# Patient Record
Sex: MALE
[De-identification: names, ages, dates, MRNs, and addresses within clinical notes are randomized per-mention and may not be internally consistent; named-entity substitution may affect disease eponyms.]

---

## 2023-12-18 ENCOUNTER — APPOINTMENT (OUTPATIENT)
Dept: OTOLARYNGOLOGY | Facility: CLINIC | Age: 23
End: 2023-12-18

## 2023-12-18 PROBLEM — Z00.00 ENCOUNTER FOR PREVENTIVE HEALTH EXAMINATION: Status: ACTIVE | Noted: 2023-12-18

## 2023-12-29 ENCOUNTER — APPOINTMENT (OUTPATIENT)
Dept: OTOLARYNGOLOGY | Facility: CLINIC | Age: 23
End: 2023-12-29
Payer: COMMERCIAL

## 2023-12-29 VITALS
WEIGHT: 315 LBS | OXYGEN SATURATION: 96 % | HEIGHT: 74 IN | TEMPERATURE: 96.6 F | BODY MASS INDEX: 40.43 KG/M2 | SYSTOLIC BLOOD PRESSURE: 138 MMHG | DIASTOLIC BLOOD PRESSURE: 87 MMHG | HEART RATE: 93 BPM

## 2023-12-29 DIAGNOSIS — R40.0 SOMNOLENCE: ICD-10-CM

## 2023-12-29 DIAGNOSIS — R09.81 NASAL CONGESTION: ICD-10-CM

## 2023-12-29 DIAGNOSIS — R06.81 APNEA, NOT ELSEWHERE CLASSIFIED: ICD-10-CM

## 2023-12-29 DIAGNOSIS — H61.23 IMPACTED CERUMEN, BILATERAL: ICD-10-CM

## 2023-12-29 DIAGNOSIS — J34.2 DEVIATED NASAL SEPTUM: ICD-10-CM

## 2023-12-29 DIAGNOSIS — J35.1 HYPERTROPHY OF TONSILS: ICD-10-CM

## 2023-12-29 DIAGNOSIS — R06.83 SNORING: ICD-10-CM

## 2023-12-29 DIAGNOSIS — Z78.9 OTHER SPECIFIED HEALTH STATUS: ICD-10-CM

## 2023-12-29 PROCEDURE — 69210 REMOVE IMPACTED EAR WAX UNI: CPT

## 2023-12-29 PROCEDURE — 99204 OFFICE O/P NEW MOD 45 MIN: CPT | Mod: 25

## 2023-12-29 PROCEDURE — 31525 DX LARYNGOSCOPY EXCL NB: CPT

## 2023-12-29 NOTE — PROCEDURE
[FreeTextEntry3] :  Ear cleaning: Cerumen Removal/Ear Cleaning for Otitis Externa Pre-operative Diagnosis: Bilateral Cerumen Impaction Post-operative Diagnosis: Same Procedure: Binocular microscopy with cerumen removal- [id number] Procedure Details: The patient was placed in the supine position. The operating microscope was positioned. I then placed the ear speculum in the EAC. Cerumen was then removed using a mixture of otologic curettes, and suction. The TM was noted to be intact. I then performed the procedure of the opposite ear in similar fashion. The patient tolerated procedure well. Findings: Bilateral Ear Canal - normal Bilateral Tympanic Membrane - normal Recommendations: Debrox Complications: None [de-identified] : Laryngoscopy: - Pre-operative Diagnosis: head and neck exam Post-operative Diagnosis: left septal deviation, tonsil hypertrophy Anesthesia: Topical - 1 % Lidocaine/Phenylephrine Procedure: Flexible Laryngoscopy  Procedure Details: The patient was placed in the sitting position. After decongestant and anesthesia were applied the laryngoscope was passed. The nasal cavities, nasopharynx, oropharynx, hypopharynx, and larynx were all examined. Vocal folds were examined during respiration and phonation. The following findings were noted:  Findings: Nose: Left septal deviation, turbinates are normal, nasal airways patent, mucosa normal Nasopharynx: Adenoids normal, no masses, eustachian tube normal Oropharynx: Pharyngeal walls symmetric and without lesion. Tonsils/fossae symmetric Hypopharynx: Hypopharynx and pyriform sinuses without lesion. No masses or asymmetry. No pooling of secretions. Larynx: Epiglottis and aryepiglottic folds were sharp and crisp bilaterally. Bilateral false and true vocal folds normal appearance. Bilateral vocal folds fully mobile and symmetric. Airway was widely patent.  Condition: Stable. Patient tolerated procedure well.  Complications: None.

## 2023-12-29 NOTE — HISTORY OF PRESENT ILLNESS
[de-identified] : 12/29/23: 22 y/o M presents with loud snoring for the past 2 years. He has woken up from snoring. His girlfriend has witnessed him gasping for air during sleep approximately two times per night. He feels tired throughout the day. No issues chewing, eating, or swallowing. No voice issues. He denies recurrent tonsillitis. Nonsmoker. He has history of keloids. Of note he also has chronic nasal congestion. For treatment he uses Flonase every other day and Zyrtec.

## 2023-12-29 NOTE — ASSESSMENT
[FreeTextEntry1] : - 22 y/o M presents with loud snoring for the past 2 years. He has woken up from snoring. His girlfriend has witnessed him gasping for air during sleep approximately two times per night. On physical exam/ laryngoscopy he was found to have bilateral enlarged tonsils and left septal deviation. I am recommending polysomnogram to rule out obstructive sleep apnea and consultation with sleep medicine. Based on findings I recommended weight loss, CPAP, possible need for tonsillectomy in the future. Follow up in 3 months.  Of note he also has chronic nasal congestion. For treatment he uses Flonase every other day and Zyrtec.   On exam there is evidence of cerumen impaction. This was cleaned today without issue. Patient noted immediate improvement back to baseline. At this time I am recommending Debrox.   -polysomnogram -consultation with sleep specialist  -follow in 3 months  -Debrox as needed

## 2023-12-29 NOTE — PHYSICAL EXAM
[Normal] : mucosa is normal [Midline] : trachea located in midline position [] : septum deviated to the left [de-identified] : multiple keloid scars  [de-identified] : bilateral cerumen impaction left> right- cleaned away with suction/curette with no issues. [de-identified] : 3+ bilaterally

## 2024-01-04 ENCOUNTER — APPOINTMENT (OUTPATIENT)
Dept: SLEEP CENTER | Facility: HOSPITAL | Age: 24
End: 2024-01-04

## 2024-03-29 ENCOUNTER — APPOINTMENT (OUTPATIENT)
Dept: OTOLARYNGOLOGY | Facility: CLINIC | Age: 24
End: 2024-03-29

## 2024-04-25 ENCOUNTER — NON-APPOINTMENT (OUTPATIENT)
Age: 24
End: 2024-04-25

## 2024-04-26 ENCOUNTER — NON-APPOINTMENT (OUTPATIENT)
Age: 24
End: 2024-04-26

## 2024-07-30 ENCOUNTER — APPOINTMENT (OUTPATIENT)
Dept: OTOLARYNGOLOGY | Facility: CLINIC | Age: 24
End: 2024-07-30
Payer: COMMERCIAL

## 2024-07-30 VITALS
OXYGEN SATURATION: 95 % | TEMPERATURE: 97.7 F | HEIGHT: 74 IN | SYSTOLIC BLOOD PRESSURE: 135 MMHG | DIASTOLIC BLOOD PRESSURE: 91 MMHG | HEART RATE: 96 BPM | BODY MASS INDEX: 40.43 KG/M2 | WEIGHT: 315 LBS

## 2024-07-30 DIAGNOSIS — J35.1 HYPERTROPHY OF TONSILS: ICD-10-CM

## 2024-07-30 DIAGNOSIS — R09.81 NASAL CONGESTION: ICD-10-CM

## 2024-07-30 DIAGNOSIS — G47.33 OBSTRUCTIVE SLEEP APNEA (ADULT) (PEDIATRIC): ICD-10-CM

## 2024-07-30 DIAGNOSIS — R06.81 APNEA, NOT ELSEWHERE CLASSIFIED: ICD-10-CM

## 2024-07-30 DIAGNOSIS — R40.0 SOMNOLENCE: ICD-10-CM

## 2024-07-30 DIAGNOSIS — R06.83 SNORING: ICD-10-CM

## 2024-07-30 DIAGNOSIS — J34.2 DEVIATED NASAL SEPTUM: ICD-10-CM

## 2024-07-30 PROCEDURE — 99214 OFFICE O/P EST MOD 30 MIN: CPT

## 2024-07-30 NOTE — ASSESSMENT
[FreeTextEntry1] : - 24 y/o M presents with loud snoring for the past 2 years. He has woken up from snoring. His girlfriend has witnessed him gasping for air during sleep approximately two times per night. On physical exam/ laryngoscopy he was found to have bilateral enlarged tonsils and left septal deviation. I am recommending polysomnogram to rule out obstructive sleep apnea and consultation with sleep medicine. Based on findings I recommended weight loss, CPAP, possible need for tonsillectomy in the future. Follow up in 3 months.  Of note he also has chronic nasal congestion. For treatment he uses Flonase every other day and Zyrtec.   On exam there is evidence of cerumen impaction. This was cleaned today without issue. Patient noted immediate improvement back to baseline. At this time I am recommending Debrox.   7/30/2024: Overall stable.  No changes in symptoms.  Did complete polysomnogram with evidence of severe obstructive sleep apnea, AHI equals 102.  At this time we had a lengthy discussion regarding neck steps.  First I do want a plan for a titration study and ultimately with PAP for further treatment.  I recommended consultation with Dr. Carrie Ambriz for potential sleep surgery.  I am also recommending consultation with endocrinology Dr. Emy Rubio for weight loss management.  Also with Dr. Mj Reilly for potential bariatric surgery.   -Titration study with an goal of PAP -consultation with sleep specialist  -Consultation with Carrie Ambriz for potential sleep surgery, evaluation and management -Follow-up with endocrinology as well as bariatric surgery, referral information given -follow with me as needed

## 2024-07-30 NOTE — DATA REVIEWED
[de-identified] : - 4/3/2024 Impression: 1.  This was a technically adequate study with no limitations noted 2.  Severe obstructive sleep apnea with an overall AHI of 102.0 events per hour 3.  Severe oxygen desaturation to 70%  Recommendations: 1.  In lab PAP titration study to resolve documented MICHAEL 2.  If PAP is not desired consider ENT evaluation for upper airway or dental evaluation 3.  Weight reduction 4.  Alcohol avoidance and avoiding sedating medications 5.  Patient should be warned not to drive or operate heavy machinery when sleepy or sleep deprived 6.  Follow-up with sleep specialist if clinically indicated

## 2024-07-30 NOTE — HISTORY OF PRESENT ILLNESS
[de-identified] : 12/29/23: 24 y/o M presents with loud snoring for the past 2 years. He has woken up from snoring. His girlfriend has witnessed him gasping for air during sleep approximately two times per night. He feels tired throughout the day. No issues chewing, eating, or swallowing. No voice issues. He denies recurrent tonsillitis. Nonsmoker. He has history of keloids. Of note he also has chronic nasal congestion. For treatment he uses Flonase every other day and Zyrtec.  - [FreeTextEntry1] : 7/30/24 Overall stable.  No changes in symptoms.  Did complete sleep study and presents to review the results.  No new ENT issues otherwise.

## 2024-08-14 ENCOUNTER — NON-APPOINTMENT (OUTPATIENT)
Age: 24
End: 2024-08-14

## 2024-08-15 ENCOUNTER — APPOINTMENT (OUTPATIENT)
Dept: OTOLARYNGOLOGY | Facility: CLINIC | Age: 24
End: 2024-08-15

## 2024-08-15 VITALS
WEIGHT: 315 LBS | SYSTOLIC BLOOD PRESSURE: 152 MMHG | HEART RATE: 89 BPM | HEIGHT: 74 IN | BODY MASS INDEX: 40.43 KG/M2 | DIASTOLIC BLOOD PRESSURE: 102 MMHG

## 2024-08-15 PROCEDURE — 99214 OFFICE O/P EST MOD 30 MIN: CPT | Mod: 25

## 2024-08-15 PROCEDURE — 31575 DIAGNOSTIC LARYNGOSCOPY: CPT

## 2024-09-05 PROBLEM — Z87.2 HISTORY OF KELOID OF SKIN: Status: RESOLVED | Noted: 2024-09-05 | Resolved: 2024-09-05

## 2024-09-05 PROBLEM — Z82.49 FAMILY HISTORY OF HYPERTENSION: Status: ACTIVE | Noted: 2024-09-05

## 2024-09-05 PROBLEM — I51.7 ENLARGEMENT OF RIGHT ATRIUM: Status: ACTIVE | Noted: 2024-09-05

## 2024-09-05 PROBLEM — Z82.5 FAMILY HISTORY OF ASTHMA: Status: ACTIVE | Noted: 2024-09-05

## 2024-09-05 PROBLEM — Z80.3 FAMILY HISTORY OF BREAST CANCER: Status: ACTIVE | Noted: 2024-09-05

## 2024-09-05 PROBLEM — K76.0 NAFLD (NONALCOHOLIC FATTY LIVER DISEASE): Status: RESOLVED | Noted: 2024-09-05 | Resolved: 2024-09-05

## 2024-09-05 PROBLEM — Z87.09 HISTORY OF ASTHMA: Status: RESOLVED | Noted: 2024-09-05 | Resolved: 2024-09-05

## 2024-09-05 PROBLEM — Z83.3 FAMILY HISTORY OF DIABETES MELLITUS: Status: ACTIVE | Noted: 2024-09-05

## 2024-09-05 PROBLEM — H61.23 BILATERAL IMPACTED CERUMEN: Status: RESOLVED | Noted: 2023-12-29 | Resolved: 2024-09-05

## 2024-09-05 PROBLEM — E66.01 OBESITY, MORBID (MORE THAN 100 LBS OVER IDEAL WEIGHT OR BMI > 40): Status: ACTIVE | Noted: 2024-09-05

## 2024-09-19 ENCOUNTER — APPOINTMENT (OUTPATIENT)
Dept: SLEEP CENTER | Facility: HOSPITAL | Age: 24
End: 2024-09-19

## 2024-09-19 ENCOUNTER — OUTPATIENT (OUTPATIENT)
Dept: OUTPATIENT SERVICES | Facility: HOSPITAL | Age: 24
LOS: 1 days | End: 2024-09-19
Payer: COMMERCIAL

## 2024-09-19 DIAGNOSIS — G47.33 OBSTRUCTIVE SLEEP APNEA (ADULT) (PEDIATRIC): ICD-10-CM

## 2024-09-19 PROCEDURE — 95811 POLYSOM 6/>YRS CPAP 4/> PARM: CPT | Mod: 26

## 2024-09-19 PROCEDURE — 95811 POLYSOM 6/>YRS CPAP 4/> PARM: CPT

## 2024-10-09 ENCOUNTER — TRANSCRIPTION ENCOUNTER (OUTPATIENT)
Age: 24
End: 2024-10-09

## 2025-01-31 ENCOUNTER — APPOINTMENT (OUTPATIENT)
Dept: OTOLARYNGOLOGY | Facility: CLINIC | Age: 25
End: 2025-01-31

## 2025-04-28 ENCOUNTER — APPOINTMENT (OUTPATIENT)
Dept: OTOLARYNGOLOGY | Facility: CLINIC | Age: 25
End: 2025-04-28
Payer: COMMERCIAL

## 2025-04-28 ENCOUNTER — NON-APPOINTMENT (OUTPATIENT)
Age: 25
End: 2025-04-28

## 2025-04-28 DIAGNOSIS — H61.20 IMPACTED CERUMEN, UNSPECIFIED EAR: ICD-10-CM

## 2025-04-28 DIAGNOSIS — G47.33 OBSTRUCTIVE SLEEP APNEA (ADULT) (PEDIATRIC): ICD-10-CM

## 2025-04-28 PROCEDURE — 99204 OFFICE O/P NEW MOD 45 MIN: CPT | Mod: 25

## 2025-04-28 PROCEDURE — 69210 REMOVE IMPACTED EAR WAX UNI: CPT

## 2025-04-28 PROCEDURE — 31231 NASAL ENDOSCOPY DX: CPT
